# Patient Record
(demographics unavailable — no encounter records)

---

## 2025-07-15 NOTE — ASSESSMENT
[FreeTextEntry1] : Eustachian Tube Dysfunction - RIGHT - 1 chronic illness - discussed treatment options for eustachian tube dysfunction including observation, steroid course, ear tube, surgery - patient would like to pursue valsalva + flonase, medrol dose pack - The potential side effects of corticosteroid use were discussed at length with the patient which include but are not limited to: increased appetite, insomnia, fluid retention, mood swings, weight gain, change in blood pressure, increased blood glucose levels, osteoporosis, avascular necrosis of the hip, menstrual irregularities (if applicable), stomach ulcers/bleeding, glaucoma, and cataracts. The patient understands these risks and is willing to proceed with oral steroid therapy. - Take the steroid first thing in the morning with a meal - If you begin to have pain in your hip, you may have avascular necrosis of the hip which is a serious side-effect. Notify the office immediately and cease vigorous physical activity - f/u as needed  Deviated Nasal Septum - to the LEFT - 1 chronic illness - no acute intervention at this time  TMJ - 1 chronic illness with exacerbation - discussed the treatment options for TMJ - warm compresses - massage - soft diet for 3 weeks - ibuprofen 600mg three times a day for three weeks - The potential side effects of ibuprofen were discussed at length with the patient which include but are not limited to: stomach pain, constipation, diarrhea, gas, heartburn, nausea, vomiting, dizziness, headache, rash, bleeding - Patient instructed to take ibuprofen with a meal to help prevent stomach ulcers/bleeding - see dentist or [facility] if needed  Bilateral Cerumen Impaction - 1 chronic illness - Bilateral cerumen impaction removed under microscopy with instrumentation

## 2025-07-15 NOTE — PHYSICAL EXAM
[TextEntry] : General: AAO, no significant distress Psychiatric: affect pleasant and within normal limits Eyes: relatively symmetric, no obvious nystagmus Skin: no significant lesions on face Nose: no significant lesions; patent. Oral Cavity & Oropharynx: no significant deformity or lesions Neck/Lymphatics: no significant masses or abnormal cervical nodes palpated Respiratory: breathing comfortably; no significant distress Neurologic: cranial nerves II-XII grossly intact; EOMI Facial function: symmetric   Ear examination performed under binocular otologic microscope: Left Ear External: Pinna and periauricular area is normal. Canal: Ear canal skin is not inflamed or edematous. Left cerumen impaction cleaned under microscopy with instrumentation Tympanic Membrane: Intact and in good position.   Right Ear External: Pinna and periauricular area is normal. Canal: Ear canal skin is not inflamed or edematous. Right cerumen impaction cleaned under microscopy with instrumentation Tympanic Membrane: Intact and in good position.  Bilateral TMJs tender to palpation   Procedure: Left cerumen removal Pre-operative Diagnosis: Left cerumen impaction Post-operative Diagnosis: Left cerumen impaction Anesthesia: None Procedure Details: The patient was placed in the supine position. The left ear canal was determined to be impacted with cerumen. A curette and/or suction was used to remove the cerumen impaction under microscopy. Condition: Stable. Patient tolerated procedure well. Complications: None.   Procedure: Right cerumen removal Pre-operative Diagnosis: Right cerumen impaction Post-operative Diagnosis: Right cerumen impaction Anesthesia: None Procedure Details: The patient was placed in the supine position. The right ear canal was determined to be impacted with cerumen. A curette and/or suction was used to remove the cerumen impaction under microscopy. Condition: Stable. Patient tolerated procedure well. Complications: None.   Nasal Endoscopy:   Pre-operative Diagnosis: eustachian tube dysfunction Post-operative Diagnosis: same + deviated nasal septum Anesthesia: None Procedure: Bilateral nasal endoscopy Procedure Details:   The patient was placed in the seated position sitting straight up. The telescope was passed along the left nasal floor to the nasopharynx. It was then passed into the region of the middle meatus, middle turbinate, and the sphenoethmoid region. An identical procedure was performed on the right side.   Findings: Interior nasal cavity: normal bilaterally Middle and superior meatus: normal bilaterally Sphenoethmoidal recess: normal bilaterally Mucosa: normal bilaterally Nasal septum: deviated nasal septum to the LEFT Discharge: none bilaterally Turbinates: normal bilaterally Adenoid: normal bilaterally Posterior choanae: normal bilaterally Eustachian tubes: normal bilaterally Mucous stranding: normal bilaterally Lesions: Not present     Comments:   Condition: Stable. Patient tolerated procedure well.

## 2025-07-15 NOTE — HISTORY OF PRESENT ILLNESS
[de-identified] : 29F who presents with ear problems R>L for her whole life. She had ear infections as a child in both ears but never needed ear tubes. She has had 2-3 ear infection on the right in the last 1.5 years. She feels that her right ear is plugged and muffled which started last night and got worse after a plane ride this morning. She does not have hearing loss, tinnitus, or vertigo. She also endorses bilateral jaw pain. She may grind/clench her teeth at night.